# Patient Record
Sex: FEMALE | Race: OTHER | NOT HISPANIC OR LATINO | URBAN - METROPOLITAN AREA
[De-identification: names, ages, dates, MRNs, and addresses within clinical notes are randomized per-mention and may not be internally consistent; named-entity substitution may affect disease eponyms.]

---

## 2023-10-22 ENCOUNTER — EMERGENCY (EMERGENCY)
Facility: HOSPITAL | Age: 23
LOS: 1 days | Discharge: HOME | End: 2023-10-22
Attending: STUDENT IN AN ORGANIZED HEALTH CARE EDUCATION/TRAINING PROGRAM
Payer: MEDICAID

## 2023-10-22 VITALS
SYSTOLIC BLOOD PRESSURE: 119 MMHG | OXYGEN SATURATION: 98 % | DIASTOLIC BLOOD PRESSURE: 75 MMHG | RESPIRATION RATE: 18 BRPM | TEMPERATURE: 98 F | HEART RATE: 98 BPM

## 2023-10-22 VITALS
DIASTOLIC BLOOD PRESSURE: 73 MMHG | SYSTOLIC BLOOD PRESSURE: 117 MMHG | TEMPERATURE: 98 F | HEIGHT: 64 IN | WEIGHT: 123.46 LBS | HEART RATE: 108 BPM | RESPIRATION RATE: 16 BRPM | OXYGEN SATURATION: 98 %

## 2023-10-22 LAB
APPEARANCE UR: ABNORMAL
APPEARANCE UR: ABNORMAL
BACTERIA # UR AUTO: ABNORMAL /HPF
BACTERIA # UR AUTO: ABNORMAL /HPF
BILIRUB UR-MCNC: NEGATIVE — SIGNIFICANT CHANGE UP
BILIRUB UR-MCNC: NEGATIVE — SIGNIFICANT CHANGE UP
COLOR SPEC: YELLOW — SIGNIFICANT CHANGE UP
COLOR SPEC: YELLOW — SIGNIFICANT CHANGE UP
DIFF PNL FLD: NEGATIVE — SIGNIFICANT CHANGE UP
DIFF PNL FLD: NEGATIVE — SIGNIFICANT CHANGE UP
EPI CELLS # UR: PRESENT
EPI CELLS # UR: PRESENT
GLUCOSE UR QL: NEGATIVE MG/DL — SIGNIFICANT CHANGE UP
GLUCOSE UR QL: NEGATIVE MG/DL — SIGNIFICANT CHANGE UP
KETONES UR-MCNC: 80 MG/DL
KETONES UR-MCNC: 80 MG/DL
LEUKOCYTE ESTERASE UR-ACNC: NEGATIVE — SIGNIFICANT CHANGE UP
LEUKOCYTE ESTERASE UR-ACNC: NEGATIVE — SIGNIFICANT CHANGE UP
NITRITE UR-MCNC: NEGATIVE — SIGNIFICANT CHANGE UP
NITRITE UR-MCNC: NEGATIVE — SIGNIFICANT CHANGE UP
PH UR: 6.5 — SIGNIFICANT CHANGE UP (ref 5–8)
PH UR: 6.5 — SIGNIFICANT CHANGE UP (ref 5–8)
PROT UR-MCNC: NEGATIVE MG/DL — SIGNIFICANT CHANGE UP
PROT UR-MCNC: NEGATIVE MG/DL — SIGNIFICANT CHANGE UP
RBC CASTS # UR COMP ASSIST: 1 /HPF — SIGNIFICANT CHANGE UP (ref 0–4)
RBC CASTS # UR COMP ASSIST: 1 /HPF — SIGNIFICANT CHANGE UP (ref 0–4)
SP GR SPEC: 1.01 — SIGNIFICANT CHANGE UP (ref 1–1.03)
SP GR SPEC: 1.01 — SIGNIFICANT CHANGE UP (ref 1–1.03)
UROBILINOGEN FLD QL: 0.2 MG/DL — SIGNIFICANT CHANGE UP (ref 0.2–1)
UROBILINOGEN FLD QL: 0.2 MG/DL — SIGNIFICANT CHANGE UP (ref 0.2–1)
WBC UR QL: 2 /HPF — SIGNIFICANT CHANGE UP (ref 0–5)
WBC UR QL: 2 /HPF — SIGNIFICANT CHANGE UP (ref 0–5)

## 2023-10-22 PROCEDURE — 76805 OB US >/= 14 WKS SNGL FETUS: CPT

## 2023-10-22 PROCEDURE — 99284 EMERGENCY DEPT VISIT MOD MDM: CPT

## 2023-10-22 PROCEDURE — 99284 EMERGENCY DEPT VISIT MOD MDM: CPT | Mod: 25

## 2023-10-22 PROCEDURE — 76805 OB US >/= 14 WKS SNGL FETUS: CPT | Mod: 26

## 2023-10-22 PROCEDURE — 87086 URINE CULTURE/COLONY COUNT: CPT

## 2023-10-22 PROCEDURE — 81001 URINALYSIS AUTO W/SCOPE: CPT

## 2023-10-22 RX ADMIN — Medication 1 ENEMA: at 22:10

## 2023-10-22 NOTE — ED PROVIDER NOTE - NSFOLLOWUPINSTRUCTIONS_ED_ALL_ED_FT
Constipation, Adult    Constipation is when a person has fewer than three bowel movements in a week, has difficulty having a bowel movement, or has stools (feces) that are dry, hard, or larger than normal. Constipation may be caused by an underlying condition. It may become worse with age if a person takes certain medicines and does not take in enough fluids.    Follow these instructions at home:      Eating and drinking     Eat foods that have a lot of fiber, such as beans, whole grains, and fresh fruits and vegetables.  Limit foods that are low in fiber and high in fat and processed sugars, such as fried or sweet foods. These include french fries, hamburgers, cookies, candies, and soda.  Drink enough fluid to keep your urine pale yellow.        General instructions    Exercise regularly or as told by your health care provider. Try to do 150 minutes of moderate exercise each week.  Use the bathroom when you have the urge to go. Do not hold it in.  Take over-the-counter and prescription medicines only as told by your health care provider. This includes any fiber supplements.  During bowel movements:     Practice deep breathing while relaxing the lower abdomen.  Practice pelvic floor relaxation.  Watch your condition for any changes. Let your health care provider know about them.  Keep all follow-up visits as told by your health care provider. This is important.    Contact a health care provider if:  You have pain that gets worse.  You have a fever.  You do not have a bowel movement after 4 days.  You vomit.  You are not hungry or you lose weight.  You are bleeding from the opening between the buttocks (anus).  You have thin, pencil-like stools.    Get help right away if:  You have a fever and your symptoms suddenly get worse.  You leak stool or have blood in your stool.  Your abdomen is bloated.  You have severe pain in your abdomen.  You feel dizzy or you faint.    Summary  Constipation is when a person has fewer than three bowel movements in a week, has difficulty having a bowel movement, or has stools (feces) that are dry, hard, or larger than normal.  Eat foods that have a lot of fiber, such as beans, whole grains, and fresh fruits and vegetables.  Drink enough fluid to keep your urine pale yellow.  Take over-the-counter and prescription medicines only as told by your health care provider. This includes any fiber supplements.

## 2023-10-22 NOTE — ED ADULT NURSE NOTE - NSFALLUNIVINTERV_ED_ALL_ED
Bed/Stretcher in lowest position, wheels locked, appropriate side rails in place/Call bell, personal items and telephone in reach/Instruct patient to call for assistance before getting out of bed/chair/stretcher/Non-slip footwear applied when patient is off stretcher/[place] to call system/Physically safe environment - no spills, clutter or unnecessary equipment/Purposeful proactive rounding/Room/bathroom lighting operational, light cord in reach

## 2023-10-22 NOTE — ED PROVIDER NOTE - PATIENT PORTAL LINK FT
You can access the FollowMyHealth Patient Portal offered by [facility] by registering at the following website: [email or web address]. By joining [facility]’s FollowMyHealth portal, you will also be able to view your health information using other applications (apps) compatible with our system.

## 2023-10-22 NOTE — ED PROVIDER NOTE - CLINICAL SUMMARY MEDICAL DECISION MAKING FREE TEXT BOX
Yehuda: 23 year old female  approximately 15 weeks pregnant presents with constipation x 6 days. Patient reports inability to have BM over the past 6 days, reports using milk of magnesia and glycerin suppository with little improvement. Patient states passing flatus. No abdominal surgeries in the past. Denies any fevers, chest pain, shortness of breath,  vomiting, diarrhea, bloody stools, black tarry stools, vaginal bleeding, dysuria, headache, vision change, numbness, weakness, or rash. Physical exam per above. Abdomen nontender, no obstructive symptoms. Plan includes labs, US eval IUP, supportive treatment with dispo pending workup.

## 2023-10-22 NOTE — ED PROVIDER NOTE - PHYSICAL EXAMINATION
CONSTITUTIONAL: non-toxic, well appearing  SKIN: no rash, no petechiae.  EYES: pink conjunctiva, anicteric  NECK: Supple; no meningismus, no JVD  CARD: RRR, no murmurs, equal radial pulses bilaterally 2+  RESP: CTAB, no respiratory distress  ABD: Gravid, non-tender, non-distended, no peritoneal signs, no CVA tenderness  EXT: Normal ROM x4. No edema. No calf tenderness  NEURO: Alert, oriented. Neuro exam nonfocal, equal strength bilaterally  PSYCH: Cooperative, appropriate.

## 2023-10-22 NOTE — ED PROVIDER NOTE - PROGRESS NOTE DETAILS
Lucks-DO: labs/US non-actionable. Patient had large BM after enema with improved symptoms. Will DC with GYN follow up/return precautions.

## 2023-10-22 NOTE — ED PROVIDER NOTE - OBJECTIVE STATEMENT
23 year old female  approximately 15 weeks pregnant presents with constipation x 6 days. Patient reports inability to have BM over the past 6 days, reports using milk of magnesia and glycerin suppository with little improvement. Patient states passing flatus. No abdominal surgeries in the past. Denies any fevers, chest pain, shortness of breath,  vomiting, diarrhea, bloody stools, black tarry stools, vaginal bleeding, dysuria, headache, vision change, numbness, weakness, or rash. Denies any additional complaints.

## 2023-10-22 NOTE — ED ADULT NURSE NOTE - ED STAT RN HANDOFF DETAILS
all dc instructions provided pt verbalized understanding all questions answered. Pt had BM, constipation relieved at this time denies any complaints at this time

## 2023-10-24 LAB
CULTURE RESULTS: SIGNIFICANT CHANGE UP
CULTURE RESULTS: SIGNIFICANT CHANGE UP
SPECIMEN SOURCE: SIGNIFICANT CHANGE UP
SPECIMEN SOURCE: SIGNIFICANT CHANGE UP

## 2023-12-02 ENCOUNTER — OUTPATIENT (OUTPATIENT)
Dept: OUTPATIENT SERVICES | Facility: HOSPITAL | Age: 23
LOS: 1 days | End: 2023-12-02
Payer: COMMERCIAL

## 2023-12-02 DIAGNOSIS — O26.899 OTHER SPECIFIED PREGNANCY RELATED CONDITIONS, UNSPECIFIED TRIMESTER: ICD-10-CM

## 2023-12-02 PROCEDURE — G0463: CPT

## 2023-12-02 PROCEDURE — 99212 OFFICE O/P EST SF 10 MIN: CPT

## 2023-12-02 RX ORDER — FAMOTIDINE 10 MG/ML
1 INJECTION INTRAVENOUS
Qty: 60 | Refills: 3
Start: 2023-12-02 | End: 2024-03-30

## 2023-12-04 DIAGNOSIS — Z86.16 PERSONAL HISTORY OF COVID-19: ICD-10-CM

## 2023-12-04 DIAGNOSIS — Z3A.21 21 WEEKS GESTATION OF PREGNANCY: ICD-10-CM

## 2023-12-04 DIAGNOSIS — O21.2 LATE VOMITING OF PREGNANCY: ICD-10-CM

## 2023-12-04 DIAGNOSIS — K21.9 GASTRO-ESOPHAGEAL REFLUX DISEASE WITHOUT ESOPHAGITIS: ICD-10-CM

## 2023-12-04 DIAGNOSIS — K59.00 CONSTIPATION, UNSPECIFIED: ICD-10-CM

## 2023-12-04 DIAGNOSIS — O99.612 DISEASES OF THE DIGESTIVE SYSTEM COMPLICATING PREGNANCY, SECOND TRIMESTER: ICD-10-CM
